# Patient Record
(demographics unavailable — no encounter records)

---

## 2025-06-09 NOTE — PHYSICAL EXAM
[Normal Heart Sounds] : normal heart sounds [Normal Breath Sounds] : Normal breath sounds [Normal Rate and Rhythm] : normal rate and rhythm [Abdominal Masses] : No abdominal masses [Abdomen Tenderness] : ~T ~M No abdominal tenderness [No Rash or Lesion] : No rash or lesion [de-identified] : nl [de-identified] : nl [de-identified] : nl

## 2025-06-09 NOTE — ASSESSMENT
[FreeTextEntry1] : Discussion regarding all options and risks Bowel prep written and explained Scheduled for colonoscopy on 6/17/25 All lab values and imaging studies reviewed Discussed with Medicine